# Patient Record
Sex: MALE | Race: WHITE | NOT HISPANIC OR LATINO | Employment: UNEMPLOYED | URBAN - METROPOLITAN AREA
[De-identification: names, ages, dates, MRNs, and addresses within clinical notes are randomized per-mention and may not be internally consistent; named-entity substitution may affect disease eponyms.]

---

## 2022-12-06 ENCOUNTER — HOSPITAL ENCOUNTER (EMERGENCY)
Facility: MEDICAL CENTER | Age: 42
End: 2022-12-06
Attending: EMERGENCY MEDICINE

## 2022-12-06 VITALS
RESPIRATION RATE: 20 BRPM | TEMPERATURE: 97.4 F | HEIGHT: 71 IN | BODY MASS INDEX: 26.39 KG/M2 | WEIGHT: 188.49 LBS | SYSTOLIC BLOOD PRESSURE: 117 MMHG | DIASTOLIC BLOOD PRESSURE: 76 MMHG | OXYGEN SATURATION: 98 % | HEART RATE: 94 BPM

## 2022-12-06 DIAGNOSIS — G25.81 RESTLESS LEG: ICD-10-CM

## 2022-12-06 DIAGNOSIS — L73.9 FOLLICULITIS: Primary | ICD-10-CM

## 2022-12-06 LAB
ANION GAP SERPL CALC-SCNC: 7 MMOL/L (ref 7–16)
BUN SERPL-MCNC: 8 MG/DL (ref 8–22)
CALCIUM SERPL-MCNC: 9.3 MG/DL (ref 8.5–10.5)
CHLORIDE SERPL-SCNC: 104 MMOL/L (ref 96–112)
CO2 SERPL-SCNC: 28 MMOL/L (ref 20–33)
CREAT SERPL-MCNC: 0.93 MG/DL (ref 0.5–1.4)
GFR SERPLBLD CREATININE-BSD FMLA CKD-EPI: 106 ML/MIN/1.73 M 2
GLUCOSE SERPL-MCNC: 93 MG/DL (ref 65–99)
POTASSIUM SERPL-SCNC: 3.9 MMOL/L (ref 3.6–5.5)
SODIUM SERPL-SCNC: 139 MMOL/L (ref 135–145)

## 2022-12-06 PROCEDURE — 36415 COLL VENOUS BLD VENIPUNCTURE: CPT

## 2022-12-06 PROCEDURE — 80048 BASIC METABOLIC PNL TOTAL CA: CPT

## 2022-12-06 PROCEDURE — 99283 EMERGENCY DEPT VISIT LOW MDM: CPT

## 2022-12-06 PROCEDURE — 700102 HCHG RX REV CODE 250 W/ 637 OVERRIDE(OP): Performed by: EMERGENCY MEDICINE

## 2022-12-06 PROCEDURE — A9270 NON-COVERED ITEM OR SERVICE: HCPCS | Performed by: EMERGENCY MEDICINE

## 2022-12-06 RX ORDER — DOXYCYCLINE 100 MG/1
100 CAPSULE ORAL 2 TIMES DAILY
Qty: 14 CAPSULE | Refills: 0 | Status: SHIPPED | OUTPATIENT
Start: 2022-12-06 | End: 2022-12-13

## 2022-12-06 RX ORDER — GABAPENTIN 300 MG/1
300 CAPSULE ORAL ONCE
Status: COMPLETED | OUTPATIENT
Start: 2022-12-06 | End: 2022-12-06

## 2022-12-06 RX ADMIN — GABAPENTIN 300 MG: 300 CAPSULE ORAL at 03:51

## 2022-12-06 ASSESSMENT — ENCOUNTER SYMPTOMS
NERVOUS/ANXIOUS: 1
TREMORS: 1

## 2022-12-06 NOTE — ED PROVIDER NOTES
"ED Provider Note    Scribed for Mani Kohler II, M* by Janeth Ferrer. 12/6/2022  3:02 AM    Means of Arrival: Walk-in  History obtained by: Patient   Limitations: None     CHIEF COMPLAINT  Chief Complaint   Patient presents with    Tremors     The pt reports trying to go to sleep but was unable to due to the whole body restlessness and twitchs. Legs are more twitchy then the rest of the body. Left side of the face feels dull but not numb. The pt reports chronic tinnitus but is worse at this time. The pt continues to report twitchiness. The pt reports chronic use of benzos for last 20 years and has been off for the last month. The pt reports feeling anxious but not panic attack.    No facial droop, slurred speech, or arm droop noted.    Anxiety       HPI  Jori Brooks is a 42 y.o. male who presents to the Emergency Department for evaluation of bilateral leg tremors onset 1 hour prior to arrival. Todd states that he was trying to sleep tonight when he suddenly awoke because \"I felt like there were bugs crawling all over me\".  He denies a history of restless leg syndrome. Todd reports that he has not had problems like this before. He also reports a history of chronic tinnitus, but feels like it is worse at this time. He admits to associated symptoms of body aches, anxiety, and facial rash, but denies loss of appetite. No alleviating or exacerbating factors were reported. Jori reports prior alcohol abuse. He adds that his last alcoholic beverage was about 2 weeks ago.     REVIEW OF SYSTEMS  Review of Systems   Skin:  Positive for itching and rash.   Neurological:  Positive for tremors.   Psychiatric/Behavioral:  The patient is nervous/anxious.    All other systems reviewed and are negative.  See HPI for further details.    PAST MEDICAL HISTORY   has a past medical history of Anxiety.    SOCIAL HISTORY  Social History     Tobacco Use    Smoking status: Every Day     Packs/day: 0.50     Types: Cigarettes " "   Smokeless tobacco: Never   Vaping Use    Vaping Use: Never used   Substance and Sexual Activity    Alcohol use: Not Currently    Drug use: Not Currently    Sexual activity: Not on file       SURGICAL HISTORY  patient denies any surgical history    CURRENT MEDICATIONS  Home Medications       Reviewed by Gurjit Palomino R.N. (Registered Nurse) on 12/06/22 at 0106  Med List Status: Partial     Medication Last Dose Status        Patient Seth Taking any Medications                           ALLERGIES  Allergies   Allergen Reactions    Sulfa Drugs Rash     When 11 years old       PHYSICAL EXAM  VITAL SIGNS: /84   Pulse 92   Temp 36.2 °C (97.1 °F) (Temporal)   Resp 16   Ht 1.803 m (5' 11\")   Wt 85.5 kg (188 lb 7.9 oz)   SpO2 97%   BMI 26.29 kg/m²       Pulse ox interpretation: I interpret this pulse ox as normal.  Constitutional: Alert in no apparent distress.   HENT: No signs of trauma, Bilateral external ears normal, Nose normal.   Eyes: Pupils are equal, Conjunctiva normal, Non-icteric.   Neck: Normal range of motion, No tenderness, Supple, No stridor.   Cardiovascular: Regular rate and rhythm, no murmurs. Symmetric distal pulses. No cyanosis of extremities. No peripheral edema of extremities.  Thorax & Lungs: Normal breath sounds, No respiratory distress, No wheezing, No chest tenderness.   Abdomen: Soft, No tenderness, No masses, No pulsatile masses. No peritoneal signs.  Skin: Warm, Dry. Pustular lesions at beard and hair.   Back: No midline bony tenderness, No CVA tenderness.   Musculoskeletal: Good range of motion in all major joints. No tenderness to palpation or major deformities noted.   Neurologic: Alert and oriented.  Normal strength. No speech change. Normal mentation.  Psychiatric: Affect normal, Judgment normal, Mood normal.     DIAGNOSTIC STUDIES / PROCEDURES    LABS  Labs Reviewed   BASIC METABOLIC PANEL   ESTIMATED GFR     Pertinent Labs & Imaging studies reviewed. (See chart for " details)    COURSE & MEDICAL DECISION MAKING  Pertinent Labs & Imaging studies reviewed. (See chart for details)    3:02 AM This is a 42 y.o. male who presents with bilateral leg tremors and anxiety. Ordered for BMP to evaluate. Patient will be treated with Neurontin for restless leg symptoms Discussed plan of care with beatrice. I informed them that labs will be ordered to evaluate electrolytes.  Beatrice is understanding and agreeable with plan.  Will prescribe doxycycline for him to take for mild folliculitis.     5:02 AM - I reevaluated the patient at bedside. The patient informs me they feel improved. I discussed the patient's diagnostic study results which were normal. I discussed plan for discharge and follow up as outlined below. The patient will return for worsening symptoms and is stable at the time of discharge. The patient verbalizes understanding and will comply. Guidance provided on appropriate use of medications.    DISPOSITION:  Patient will be discharged home in stable condition.    FOLLOW UP:  [facility] ([facility]) - Primary Care and Family Medicine  [address] [place] [address]  [phone]  Call   As needed    OUTPATIENT MEDICATIONS:  Discharge Medication List as of 12/6/2022  5:04 AM        START taking these medications    Details   doxycycline (MONODOX) 100 MG capsule Take 1 Capsule by mouth 2 times a day for 7 days., Disp-14 Capsule, R-0, Normal             FINAL IMPRESSION  1. Folliculitis    2. Restless leg          Janeth MCARTHUR (Nataliaibcandace)jess scribing for, and in the presence of, BRITTON Madison II.    Electronically signed by: Janeth Santacruz), 12/6/2022    Mani MCARTHUR II, M* personally performed the services described in this documentation, as scribed by Janeth Ferrer in my presence, and it is both accurate and complete.    The note accurately reflects work and decisions made by me.  Mani Kohler II, M.D.  12/6/2022  9:18 AM

## 2022-12-06 NOTE — ED TRIAGE NOTES
"Chief Complaint   Patient presents with    Tremors     The pt reports trying to go to sleep but was unable to due to the whole body restlessness and twitchs. Legs are more twitchy then the rest of the body. Left side of the face feels dull but not numb. The pt reports chronic tinnitus but is worse at this time. The pt continues to report twitchiness. The pt reports chronic use of benzos for last 20 years and has been off for the last month. The pt reports feeling anxious but not panic attack.    No facial droop, slurred speech, or arm droop noted.    Anxiety       Pt ambulatory to triage. Pt A&Ox4, for the above complaint.     Pt to lobby . Pt educated on alerting staff in changes to condition. Pt verbalized understanding.     /84   Pulse 92   Temp 36.2 °C (97.1 °F) (Temporal)   Resp 16   Ht 1.803 m (5' 11\")   Wt 85.5 kg (188 lb 7.9 oz)   SpO2 97%   BMI 26.29 kg/m²     "

## 2022-12-06 NOTE — ED NOTES
Pt just walked out of department. Didn't speak to this RN but told the front he was running to his car and he would be right back

## 2022-12-06 NOTE — ED NOTES
Pt ambulated from  to [place] [id number] by self with no difficulty. Pt reports anxiousness and has been off medications x 1 month and is restless. Pt also c/o bumps all over his face/scalp and generalized itching/twitching

## 2023-03-23 ENCOUNTER — HOSPITAL ENCOUNTER (EMERGENCY)
Facility: MEDICAL CENTER | Age: 43
End: 2023-03-23
Attending: EMERGENCY MEDICINE

## 2023-03-23 VITALS
HEART RATE: 82 BPM | TEMPERATURE: 98.3 F | RESPIRATION RATE: 18 BRPM | BODY MASS INDEX: 26.08 KG/M2 | SYSTOLIC BLOOD PRESSURE: 109 MMHG | WEIGHT: 186.29 LBS | OXYGEN SATURATION: 93 % | HEIGHT: 71 IN | DIASTOLIC BLOOD PRESSURE: 72 MMHG

## 2023-03-23 DIAGNOSIS — L02.91 ABSCESS: ICD-10-CM

## 2023-03-23 PROCEDURE — 700101 HCHG RX REV CODE 250: Performed by: EMERGENCY MEDICINE

## 2023-03-23 PROCEDURE — A9270 NON-COVERED ITEM OR SERVICE: HCPCS | Performed by: EMERGENCY MEDICINE

## 2023-03-23 PROCEDURE — 99283 EMERGENCY DEPT VISIT LOW MDM: CPT

## 2023-03-23 PROCEDURE — 700102 HCHG RX REV CODE 250 W/ 637 OVERRIDE(OP): Performed by: EMERGENCY MEDICINE

## 2023-03-23 PROCEDURE — 303977 HCHG I & D

## 2023-03-23 RX ORDER — DOXYCYCLINE 100 MG/1
100 CAPSULE ORAL 2 TIMES DAILY
Qty: 14 CAPSULE | Refills: 0 | Status: ACTIVE | OUTPATIENT
Start: 2023-03-23 | End: 2023-03-30

## 2023-03-23 RX ORDER — IBUPROFEN 600 MG/1
600 TABLET ORAL ONCE
Status: COMPLETED | OUTPATIENT
Start: 2023-03-23 | End: 2023-03-23

## 2023-03-23 RX ORDER — LIDOCAINE HYDROCHLORIDE AND EPINEPHRINE BITARTRATE 20; .01 MG/ML; MG/ML
10 INJECTION, SOLUTION SUBCUTANEOUS ONCE
Status: COMPLETED | OUTPATIENT
Start: 2023-03-23 | End: 2023-03-23

## 2023-03-23 RX ORDER — DOXYCYCLINE 100 MG/1
100 TABLET ORAL ONCE
Status: COMPLETED | OUTPATIENT
Start: 2023-03-23 | End: 2023-03-23

## 2023-03-23 RX ADMIN — DOXYCYCLINE 100 MG: 100 TABLET, FILM COATED ORAL at 07:48

## 2023-03-23 RX ADMIN — IBUPROFEN 600 MG: 600 TABLET, FILM COATED ORAL at 07:48

## 2023-03-23 RX ADMIN — LIDOCAINE HYDROCHLORIDE AND EPINEPHRINE 10 ML: 20; 10 INJECTION, SOLUTION INFILTRATION; PERINEURAL at 08:14

## 2023-03-23 NOTE — ED TRIAGE NOTES
Chief Complaint   Patient presents with    Abscess     Small abscess to the left jaw line x2 days. Skin is warm to the touch and mildly swollen around the perimeter. Patient states he stopped using meth last week and it appeared shortly after.       Patient denies CP or SOB.     Patient ambulatory to triage. A&Ox4, speaking in full sentences. Patient educated on triage process and encouraged to notify staff if condition worsens. NAD. Patient returned to the lobby.  
No

## 2023-03-23 NOTE — ED NOTES
Pt ambulatory to [facility] with a steady gait. Pt connected to the monitor, given warm blankets, and call light placed within reach. Chart up for ERP.

## 2023-03-23 NOTE — ED NOTES
Reviewed discharge instructions, pt verbalized understanding of instructions and medication. States he will schedule follow-up appointment if needed. Denies further questions at this time. Pt ambulatory out of ER with steady gait.

## 2023-03-23 NOTE — ED PROVIDER NOTES
"  ER Provider Note    Scribed for Matias Gottlieb M.d. by Jose Cruz Valdes. 3/23/2023  7:31 AM    Primary Care Provider: Pcp Pt States None    CHIEF COMPLAINT  Chief Complaint   Patient presents with    Abscess     Small abscess to the left jaw line x2 days. Skin is warm to the touch and mildly swollen around the perimeter. Patient states he stopped using meth last week and it appeared shortly after.      EXTERNAL RECORDS REVIEWED  Other None    HPI/ROS  LIMITATION TO HISTORY   Select: : None  OUTSIDE HISTORIAN(S):  None    Jori Brooks is a 42 y.o. male who presents to the ED complaining of swelling to the left jaw onset 2 days ago. He believes the swelling to be an abscess. He has associated redness and warmth to the area, and mild chills. He denies any fevers, or sweats. He is allergic to sulfa drugs. He quit using meth last week. The swelling appeared a few days later. He notes that a few days ago he had some other small swellings pop up to his right shoulder area, but they went away. No alleviating or exacerbating factors noted.     PAST MEDICAL HISTORY  Past Medical History:   Diagnosis Date    Anxiety        SURGICAL HISTORY  History reviewed. No pertinent surgical history.    FAMILY HISTORY  History reviewed. No pertinent family history.    SOCIAL HISTORY   reports that he has been smoking cigarettes. He has been smoking an average of .75 packs per day. He has never used smokeless tobacco. He reports that he does not currently use alcohol. He reports that he does not currently use drugs.    CURRENT MEDICATIONS  No current outpatient medications.    ALLERGIES  Sulfa drugs    PHYSICAL EXAM  /80   Pulse 96   Temp 36.3 °C (97.3 °F) (Temporal)   Resp 16   Ht 1.803 m (5' 11\")   Wt 84.5 kg (186 lb 4.6 oz)   SpO2 94%   BMI 25.98 kg/m²   Constitutional: Well developed, Well nourished, No acute distress, Non-toxic appearance.   HENT: Normocephalic, Atraumatic, Bilateral external ears normal, Oropharynx is " clear mucous membranes are moist. No oral exudates or nasal discharge. To the left upper jaw line there is an area of induration, erythema, warmth, and tenderness.   Eyes: Pupils are equal round and reactive, EOMI, Conjunctiva normal, No discharge.   Neck: Normal range of motion, No tenderness, Supple, No stridor. No meningismus.  Lymphatic: Slight adenopathy in anterior auricular area and submandibular area.  Cardiovascular: Regular rate and rhythm without murmur rub or gallop.  Thorax & Lungs: Clear breath sounds bilaterally without wheezes, rhonchi or rales. There is no chest wall tenderness.   Abdomen: Soft non-tender non-distended. There is no rebound or guarding. No organomegaly is appreciated. Bowel sounds are normal.  Skin: Area of induration erythema, induration, erythema, and warmth to the left upper jaw.  Back: No CVA or spinal tenderness.   Extremities: Intact distal pulses, No edema, No tenderness, No cyanosis, No clubbing. Capillary refill is less than 2 seconds.  Musculoskeletal: Good range of motion in all major joints. No tenderness to palpation or major deformities noted.   Neurologic: Alert & oriented x 3, Normal motor function, Normal sensory function, No focal deficits noted. Reflexes are normal.  Psychiatric: Affect normal, Judgment normal, Mood normal. There is no suicidal ideation or patient reported hallucinations.     DIAGNOSTIC STUDIES    Radiology:   The attending emergency physician has independently interpreted the bedside ultrasound performed by [facility] with high-frequency probe that shows a small pus pocket just under the left jawline where the patient is complaining of pain and consistent with physical exam.      PROCEDURES    Incision and Drainage Procedure Note    Indication: Abscess    Procedure: The patient was positioned appropriately and the skin over the incision site was prepped with alcohol. Local anesthesia was obtained by infiltration using 1% Lidocaine with epinephrine and I  used approximately 1.5 cc.  An incision was then made over the apex of the lesion and approximately 0.5 cc of thick material was expressed. Loculations were not present. The drainage cavity was then dressed with a sterile dressing. The patient’s tetanus status was up to date and did not require a booster dose.    The patient tolerated the procedure well.    Complications: None      COURSE & MEDICAL DECISION MAKING     ED Observation Status? No; Patient does not meet criteria for ED Observation.     INITIAL ASSESSMENT, COURSE AND PLAN  Care Narrative: I was able to determine that the patient does indeed have a pus pocket and I recommended incision and drainage of this small abscess.    7:31 AM - Patient seen and examined at bedside. Patient will be treated with Motrin 600 mg, and Adoxa 100 mg. I informed the patient that he likely does not have an abscess, but I will perform a bedside ultrasound to confirm. If there is no abscess, he will be discharged with antibiotics. He is understandable and agreeable with the plan of care.    8:03 AM - Bedside ultrasound revealed a drainable small pus pocket. I plan to drain it. He is agreeable with this plan.    8:06 AM - Incision and drainage procedure performed by me. See details above. Patient will be discharged with antibiotics. Discussed discharge instructions and return precautions with the patient and they were cleared for discharge. Patient was given the opportunity to ask any further questions. He is comfortable with discharge at this time.         DISPOSITION AND DISCUSSIONS  I have discussed management of the patient with the following physicians and IRENE's:  None    Discussion of management with other QHP or appropriate source(s): None     Decision tools and prescription drugs considered including, but not limited to: Antibiotics   .    We additionally discussed his anxiety disorder.  The patient was asking for possibly some Ativan by prescription but I feel that this  is not warranted at this time and she should talk to his primary care provider as benzos have fallen out of favor for treatment of anxiety disorder and he is comfortable with this plan of care    Patient will be discharged home.    FOLLOW UP:  No follow-up provider specified.    OUTPATIENT MEDICATIONS:  New Prescriptions    DOXYCYCLINE (MONODOX) 100 MG CAPSULE    Take 1 Capsule by mouth 2 times a day for 7 days.       FINAL DIANGOSIS  1. Abscess    2.  Incision and drainage of abscess by ERP    Jose Cruz MCARTHUR (Scribe), am scribing for, and in the presence of, Matias Gottlieb M.D..    Electronically signed by: Jose Cruz Valdes (Scribe), 3/23/2023    Matias MCARTHUR M.D. personally performed the services described in this documentation, as scribed by Jose Cruz Valdes in my presence, and it is both accurate and complete.     The note accurately reflects work and decisions made by me.  Matias Gottlieb M.D.  3/23/2023  8:21 AM

## 2023-03-23 NOTE — DISCHARGE INSTRUCTIONS
Please take doxycycline for 1 week and use warm compresses frequently the next 24 to 48 hours to increase the amount of discharge from the incised wound

## 2023-06-19 ENCOUNTER — HOSPITAL ENCOUNTER (EMERGENCY)
Facility: MEDICAL CENTER | Age: 43
End: 2023-06-19
Attending: EMERGENCY MEDICINE

## 2023-06-19 VITALS
OXYGEN SATURATION: 93 % | HEIGHT: 71 IN | DIASTOLIC BLOOD PRESSURE: 85 MMHG | BODY MASS INDEX: 26.39 KG/M2 | WEIGHT: 188.49 LBS | SYSTOLIC BLOOD PRESSURE: 126 MMHG | TEMPERATURE: 98.1 F | RESPIRATION RATE: 16 BRPM | HEART RATE: 93 BPM

## 2023-06-19 DIAGNOSIS — I10 PRIMARY HYPERTENSION: ICD-10-CM

## 2023-06-19 DIAGNOSIS — J02.0 STREP PHARYNGITIS: Primary | ICD-10-CM

## 2023-06-19 LAB
FLUAV RNA SPEC QL NAA+PROBE: NEGATIVE
FLUBV RNA SPEC QL NAA+PROBE: NEGATIVE
RSV RNA SPEC QL NAA+PROBE: NEGATIVE
S PYO DNA SPEC NAA+PROBE: DETECTED
SARS-COV-2 RNA RESP QL NAA+PROBE: NOTDETECTED
SPECIMEN SOURCE: NORMAL

## 2023-06-19 PROCEDURE — 99284 EMERGENCY DEPT VISIT MOD MDM: CPT

## 2023-06-19 PROCEDURE — 700101 HCHG RX REV CODE 250: Performed by: EMERGENCY MEDICINE

## 2023-06-19 PROCEDURE — 700102 HCHG RX REV CODE 250 W/ 637 OVERRIDE(OP): Performed by: EMERGENCY MEDICINE

## 2023-06-19 PROCEDURE — 0241U HCHG SARS-COV-2 COVID-19 NFCT DS RESP RNA 4 TRGT MIC: CPT

## 2023-06-19 PROCEDURE — 87651 STREP A DNA AMP PROBE: CPT

## 2023-06-19 PROCEDURE — 700111 HCHG RX REV CODE 636 W/ 250 OVERRIDE (IP): Performed by: EMERGENCY MEDICINE

## 2023-06-19 PROCEDURE — C9803 HOPD COVID-19 SPEC COLLECT: HCPCS | Performed by: EMERGENCY MEDICINE

## 2023-06-19 PROCEDURE — A9270 NON-COVERED ITEM OR SERVICE: HCPCS | Performed by: EMERGENCY MEDICINE

## 2023-06-19 RX ORDER — AMOXICILLIN 875 MG/1
875 TABLET, COATED ORAL 2 TIMES DAILY
Qty: 14 TABLET | Refills: 0 | Status: ACTIVE | OUTPATIENT
Start: 2023-06-20 | End: 2023-06-27

## 2023-06-19 RX ORDER — LIDOCAINE HYDROCHLORIDE 20 MG/ML
15 SOLUTION OROPHARYNGEAL ONCE
Status: COMPLETED | OUTPATIENT
Start: 2023-06-19 | End: 2023-06-19

## 2023-06-19 RX ORDER — IBUPROFEN 600 MG/1
600 TABLET ORAL ONCE
Status: COMPLETED | OUTPATIENT
Start: 2023-06-19 | End: 2023-06-19

## 2023-06-19 RX ORDER — DEXAMETHASONE SODIUM PHOSPHATE 4 MG/ML
10 INJECTION, SOLUTION INTRA-ARTICULAR; INTRALESIONAL; INTRAMUSCULAR; INTRAVENOUS; SOFT TISSUE ONCE
Status: COMPLETED | OUTPATIENT
Start: 2023-06-19 | End: 2023-06-19

## 2023-06-19 RX ORDER — AMOXICILLIN 500 MG/1
1000 CAPSULE ORAL ONCE
Status: COMPLETED | OUTPATIENT
Start: 2023-06-19 | End: 2023-06-19

## 2023-06-19 RX ADMIN — LIDOCAINE HYDROCHLORIDE 15 ML: 20 SOLUTION ORAL; TOPICAL at 19:39

## 2023-06-19 RX ADMIN — IBUPROFEN 600 MG: 600 TABLET, FILM COATED ORAL at 19:39

## 2023-06-19 RX ADMIN — AMOXICILLIN 1000 MG: 500 CAPSULE ORAL at 20:05

## 2023-06-19 RX ADMIN — DEXAMETHASONE SODIUM PHOSPHATE 10 MG: 4 INJECTION, SOLUTION INTRAMUSCULAR; INTRAVENOUS at 20:06

## 2023-06-20 NOTE — ED TRIAGE NOTES
"Chief Complaint   Patient presents with    Sore Throat     Pt reports ongoing x4 days, worse on L side. Pt reports subjective fevers. Pt reports decreased PO intake d/t symptoms.      /81   Pulse (!) 120   Temp 36.5 °C (97.7 °F) (Temporal)   Resp 18   Ht 1.803 m (5' 11\")   Wt 85.5 kg (188 lb 7.9 oz)   SpO2 96%   BMI 26.29 kg/m²     Pt ambulatory to triage for above.   "

## 2023-06-20 NOTE — ED PROVIDER NOTES
"ED Provider Note    CHIEF COMPLAINT  Chief Complaint   Patient presents with    Sore Throat     Pt reports ongoing x4 days, worse on L side. Pt reports subjective fevers. Pt reports decreased PO intake d/t symptoms.        EXTERNAL RECORDS REVIEWED  Records show 2 prior emergency department visits, no other records of any kind in our system.    HPI/ROS  LIMITATION TO HISTORY       OUTSIDE HISTORIAN(S):      Jori Brooks is a 42 y.o. male who presents to the emergency department for 4 days of sore throat, worse within the past 24 to 48 hours.  He does not have a history of recurrent strep infections.  He reports pain with swallowing, subjective fevers, and a sense of racing heart.  He is afebrile, but slightly tachycardic.  He has not taken anything other than Benadryl for his symptoms.  He is not diabetic or immunosuppressed, denies daily medications.    PAST MEDICAL HISTORY   has a past medical history of Anxiety.    SURGICAL HISTORY  patient denies any surgical history    FAMILY HISTORY  History reviewed. No pertinent family history.    SOCIAL HISTORY  Social History     Tobacco Use    Smoking status: Every Day     Packs/day: 0.50     Types: Cigarettes    Smokeless tobacco: Never   Vaping Use    Vaping Use: Never used   Substance and Sexual Activity    Alcohol use: Yes    Drug use: Yes     Comment: cocaine    Sexual activity: Not on file       CURRENT MEDICATIONS  Home Medications       Reviewed by Carla Abbott R.N. (Registered Nurse) on 06/19/23 at 3516  Med List Status: Partial     Medication Last Dose Status        Patient Seth Taking any Medications                           ALLERGIES  Allergies   Allergen Reactions    Sulfa Drugs Rash     When 11 years old       PHYSICAL EXAM  VITAL SIGNS: /85   Pulse 93   Temp 36.7 °C (98.1 °F) (Temporal)   Resp 16   Ht 1.803 m (5' 11\")   Wt 85.5 kg (188 lb 7.9 oz)   SpO2 93%   BMI 26.29 kg/m²   Pulse ox interpretation: I interpret this pulse ox as " normal.  Constitutional: Alert in no apparent distress.  HENT: Erythematous posterior oropharynx with tonsillar enlargement and exudate.  Lymph: Tender bilateral anterior cervical lymphadenopathy  Eyes: Pupils are equal and reactive, Conjunctiva normal, Non-icteric.   Neck: Normal range of motion, Supple, No stridor.    Cardiovascular: Normal peripheral perfusion  Thorax & Lungs: Unlabored respirations, equal chest expansion, no accessory muscle use  Abdomen: Non-distended  Skin:  No erythema, No rash.   Back: Normal alignment and ROM  Extremities: No gross deformity  Musculoskeletal: Good range of motion in all major joints.   Neurologic: Alert, Normal motor function, No focal deficits noted.   Psychiatric: Affect normal, Judgment normal, Mood normal.      DIAGNOSTIC STUDIES / PROCEDURES      LABS  Labs Reviewed   GROUP A STREP BY PCR - Abnormal; Notable for the following components:       Result Value    Group A Strep by PCR DETECTED (*)     All other components within normal limits   COV-2, FLU A/B, AND RSV BY PCR (CEPHEID)         COURSE & MEDICAL DECISION MAKING    ED Observation Status? Yes; I am placing the patient in to an observation status due to a diagnostic uncertainty as well as therapeutic intensity. Patient placed in observation status at 7:00 PM, 6/19/2023.     Observation plan is as follows: Symptomatic treatment, laboratory evaluation.    Upon Reevaluation, the patient's condition has: Improved; and will be discharged.    Patient discharged from ED Observation status at 7:55 PM (Time) 6/19/2023 (Date).     INITIAL ASSESSMENT, COURSE AND PLAN  Care Narrative:     7 PM patient evaluated the bedside.  He has obvious pharyngitis, strep versus viral.  COVID is a possibility, though strep seems more likely.  He is being treated with Zofran and viscous lidocaine, and evaluated with a strep PCR and COVID swab.    8:00 PM patient reevaluated at the bedside.  Strep test was positive.  He will be treated with  Decadron and amoxicillin, and given return precautions for treatment failure or worsening symptoms.  He is happy to be discharged home.  Presenting tachycardia has resolved.    HTN/IDDM FOLLOW UP:  The patient is referred to a primary physician for blood pressure management, diabetic screening, and for all other preventive health concerns      ADDITIONAL PROBLEM LIST  Untreated hypertension, referred to primary care and discussed with patient.    DISPOSITION AND DISCUSSIONS    Escalation of care considered, and ultimately not performed:IV fluids and blood analysis, however, he is not febrile, mild presenting tachycardia resolved, no reason to suspect significant systemic illness.    Barriers to care at this time, including but not limited to: Patient does not have established PCP.     Decision tools and prescription drugs considered including, but not limited to: Antibiotics were prescribed at discharge to treat strep pharyngitis. .    FINAL DIAGNOSIS  1. Strep pharyngitis    2. Primary hypertension           Electronically signed by: Darion Amanda M.D., 6/19/2023 7:15 PM

## 2023-06-20 NOTE — ED NOTES
Patient had discharge instructions gone over via ERP, patient had no questions. Patient then was assisted to ER lobby with all possessions. Patient will be discharged from system.

## 2023-06-20 NOTE — DISCHARGE INSTRUCTIONS
You have strep throat.  You are started antibiotics here, and we sent the rest of the needed antibiotics to your pharmacy.  The steroid given here should also help with swelling and inflammation.  You will have some symptoms still for the next couple of days, but as they should be steadily improving.  For your high blood pressure and general health care, we have put in a referral so that our schedulers can contact you to help arrange a primary care appointment.  Return here if you are getting worse in the meantime.

## 2023-06-23 ENCOUNTER — TELEPHONE (OUTPATIENT)
Dept: HEALTH INFORMATION MANAGEMENT | Facility: OTHER | Age: 43
End: 2023-06-23

## 2023-08-18 ENCOUNTER — HOSPITAL ENCOUNTER (EMERGENCY)
Facility: MEDICAL CENTER | Age: 43
End: 2023-08-18
Attending: EMERGENCY MEDICINE

## 2023-08-18 VITALS
HEIGHT: 71 IN | BODY MASS INDEX: 27.16 KG/M2 | TEMPERATURE: 98 F | WEIGHT: 194 LBS | RESPIRATION RATE: 16 BRPM | DIASTOLIC BLOOD PRESSURE: 80 MMHG | SYSTOLIC BLOOD PRESSURE: 125 MMHG | HEART RATE: 72 BPM | OXYGEN SATURATION: 98 %

## 2023-08-18 DIAGNOSIS — R21 RASH: ICD-10-CM

## 2023-08-18 PROCEDURE — A9270 NON-COVERED ITEM OR SERVICE: HCPCS | Performed by: EMERGENCY MEDICINE

## 2023-08-18 PROCEDURE — 700102 HCHG RX REV CODE 250 W/ 637 OVERRIDE(OP): Performed by: EMERGENCY MEDICINE

## 2023-08-18 PROCEDURE — 99283 EMERGENCY DEPT VISIT LOW MDM: CPT

## 2023-08-18 RX ORDER — DOXYCYCLINE 100 MG/1
100 TABLET ORAL ONCE
Status: COMPLETED | OUTPATIENT
Start: 2023-08-18 | End: 2023-08-18

## 2023-08-18 RX ORDER — DOXYCYCLINE 100 MG/1
100 CAPSULE ORAL 2 TIMES DAILY
Qty: 14 CAPSULE | Refills: 0 | Status: ACTIVE | OUTPATIENT
Start: 2023-08-18 | End: 2023-08-25

## 2023-08-18 RX ADMIN — DOXYCYCLINE 100 MG: 100 TABLET, FILM COATED ORAL at 08:15

## 2023-08-18 NOTE — ED PROVIDER NOTES
"ED Provider Note    CHIEF COMPLAINT  Chief Complaint   Patient presents with    Rash     C/o rash / raised bumps on his face, back of neck and chest x 1.5 week. States he was Dx with Folliculitis in the past. States \"it looks like it is back again \".        EXTERNAL RECORDS REVIEWED  Other -patient was seen in this ER in June for sore throat.  Strep test was positive.  He was treated with Decadron and amoxicillin.  Discharged to follow-up as an outpatient.    HPI/ROS  LIMITATION TO HISTORY   Select: : None  OUTSIDE HISTORIAN(S):  None    Jori Brooks is a 42 y.o. male who presents with a chief complaint of rash on his face and neck that has been ongoing for the past 1-1/2 weeks.  He notes he was diagnosed with hot tub folliculitis approximately 1 year ago, started on antibiotics/doxycycline at which time it completely resolved.  The symptoms are exactly the same now as they were 1 year ago however he has not been in the hot tub recently.  He notes that over the past year the symptoms have come and gone several times and he has been prescribed antibiotics but has not taken them to completion.  The rash is raised red bumps, minimally pruritic, and only some of them are painful.  He has not had any recent antibiotic use.  He denies any fevers, nausea or vomiting, systemic symptoms.  He denies any high risk behaviors such same-sex intercourse without protection or IV drug use.    PAST MEDICAL HISTORY   has a past medical history of Anxiety.    SURGICAL HISTORY  patient denies any surgical history    FAMILY HISTORY  History reviewed. No pertinent family history.    SOCIAL HISTORY  Social History     Tobacco Use    Smoking status: Every Day     Packs/day: .5     Types: Cigarettes    Smokeless tobacco: Never   Vaping Use    Vaping Use: Never used   Substance and Sexual Activity    Alcohol use: Yes    Drug use: Yes     Comment: cocaine    Sexual activity: Not on file       CURRENT MEDICATIONS  Home Medications       " "Reviewed by Ruperto Cordova R.N. (Registered Nurse) on 08/18/23 at 0657  Med List Status: Partial     Medication Last Dose Status        Patient Seth Taking any Medications                           ALLERGIES  Allergies   Allergen Reactions    Sulfa Drugs Rash     When 11 years old       PHYSICAL EXAM  VITAL SIGNS: BP (!) 129/91   Pulse 76   Temp 36.1 °C (97 °F) (Temporal)   Resp 16   Ht 1.803 m (5' 11\")   Wt 88 kg (194 lb 0.1 oz)   SpO2 97%   BMI 27.06 kg/m²    Physical Exam  Vitals and nursing note reviewed.   Constitutional:       Appearance: Normal appearance.   HENT:      Head: Normocephalic and atraumatic.      Right Ear: External ear normal.      Left Ear: External ear normal.      Nose: Nose normal.      Mouth/Throat:      Mouth: Mucous membranes are moist.      Pharynx: Oropharynx is clear.   Eyes:      Extraocular Movements: Extraocular movements intact.      Conjunctiva/sclera: Conjunctivae normal.      Pupils: Pupils are equal, round, and reactive to light.   Cardiovascular:      Rate and Rhythm: Normal rate and regular rhythm.   Pulmonary:      Effort: Pulmonary effort is normal.      Breath sounds: Normal breath sounds.   Abdominal:      Palpations: Abdomen is soft.      Tenderness: There is no abdominal tenderness.   Musculoskeletal:         General: Normal range of motion.      Cervical back: Normal range of motion and neck supple.   Skin:     General: Skin is warm and dry.      Comments: Scattered blanching raised erythematous lesions over the face without obvious discharge or crusting.  No rash over the extremities or palms/wrists.   Neurological:      General: No focal deficit present.      Mental Status: He is alert and oriented to person, place, and time.   Psychiatric:         Mood and Affect: Mood normal.         Behavior: Behavior normal.       DIAGNOSTIC STUDIES / PROCEDURES    COURSE & MEDICAL DECISION MAKING    ED Observation Status? No; Patient does not meet criteria for ED " Observation.     INITIAL ASSESSMENT, COURSE AND PLAN  Care Narrative: This is a 42-year-old male who is here with a raised erythematous rash over the face and neck that has been ongoing for the past 10 days.  He has had similar rashes over the course of the past year that have responded to doxycycline.    Differential diagnosis includes, but is not limited to, MRSA, vasculitis, folliculitis, strep, lupus, dermatitis, TENS, SJS, syphilis.    Arrives afebrile with normal vital signs.  Appears well-hydrated and nontoxic.  Denies any systemic symptoms.  Rash is never been on the palms of hands and today does spare the hands and wrists.  There is no rash on the extremities.  It is blanching.  At this point I have a low suspicion that this represents folliculitis.  He notes that the rash has resolved with doxycycline in the past and we will plan to trial that but he needs follow-up with a dermatologist specifically.  At this point I do not think labs or imaging will help us to determine the etiology of this rash so they were deferred.  We discussed that if he develops any systemic symptoms or the rash changes or worsens he does need to return to the ER at which point we may consider a more in-depth work-up.  I placed a referral to dermatology on his behalf and he was given a list of local clinics where of asked him to establish, he was discharged in good and stable condition with strict return precautions.    ADDITIONAL PROBLEM LIST  None  DISPOSITION AND DISCUSSIONS  I have discussed management of the patient with the following physicians and IRENE's: None    Discussion of management with other Q or appropriate source(s): None     Escalation of care considered, and ultimately not performed:blood analysis and acute inpatient care management, however at this time, the patient is most appropriate for outpatient management    Barriers to care at this time, including but not limited to: Patient does not have established PCP.      Decision tools and prescription drugs considered including, but not limited to: Antibiotics -doxycycline .    FINAL DIAGNOSIS  1. Rash      Electronically signed by: Amauri Britton M.D., 8/18/2023 7:47 AM

## 2023-08-18 NOTE — ED TRIAGE NOTES
"Jori Brooks  42 y.o.  Male  Chief Complaint   Patient presents with    Rash     C/o rash / raised bumps on his face, back of neck and chest x 1.5 week. States he was Dx with Folliculitis in the past. States \"it looks like it is back again \".        "

## 2023-08-18 NOTE — ED NOTES
..Pt verbalizes understanding of dc instructions. Rx given.  Pt states all questions have been answered and they feel comfortable with dc instructions provided. Pt states has all personal belonging. Pt to lobby w/o incident

## 2023-08-18 NOTE — DISCHARGE INSTRUCTIONS
You were seen in the ER for facial rash.  I have called you in a prescription for doxycycline, take it as directed.  I also placed a referral to dermatology.  It would be important that you establish with a primary care physician and I gave you a list of local clinics we can do so, sometimes dermatologist come to these clinics as well also ask when you make the appointment if you would be able to also see a dermatologist.  If you develop new or worsening symptoms return immediately to the ER.

## 2023-09-23 ENCOUNTER — HOSPITAL ENCOUNTER (EMERGENCY)
Facility: MEDICAL CENTER | Age: 43
End: 2023-09-23
Attending: STUDENT IN AN ORGANIZED HEALTH CARE EDUCATION/TRAINING PROGRAM

## 2023-09-23 VITALS
WEIGHT: 192 LBS | HEIGHT: 71 IN | BODY MASS INDEX: 26.88 KG/M2 | RESPIRATION RATE: 18 BRPM | SYSTOLIC BLOOD PRESSURE: 134 MMHG | HEART RATE: 83 BPM | OXYGEN SATURATION: 95 % | DIASTOLIC BLOOD PRESSURE: 80 MMHG | TEMPERATURE: 98 F

## 2023-09-23 DIAGNOSIS — R00.2 PALPITATIONS: ICD-10-CM

## 2023-09-23 LAB
ALBUMIN SERPL BCP-MCNC: 4.2 G/DL (ref 3.2–4.9)
ALBUMIN/GLOB SERPL: 1.6 G/DL
ALP SERPL-CCNC: 66 U/L (ref 30–99)
ALT SERPL-CCNC: 18 U/L (ref 2–50)
ANION GAP SERPL CALC-SCNC: 11 MMOL/L (ref 7–16)
AST SERPL-CCNC: 19 U/L (ref 12–45)
BASOPHILS # BLD AUTO: 1.4 % (ref 0–1.8)
BASOPHILS # BLD: 0.13 K/UL (ref 0–0.12)
BILIRUB SERPL-MCNC: 0.5 MG/DL (ref 0.1–1.5)
BUN SERPL-MCNC: 11 MG/DL (ref 8–22)
CALCIUM ALBUM COR SERPL-MCNC: 8.9 MG/DL (ref 8.5–10.5)
CALCIUM SERPL-MCNC: 9.1 MG/DL (ref 8.5–10.5)
CHLORIDE SERPL-SCNC: 98 MMOL/L (ref 96–112)
CO2 SERPL-SCNC: 27 MMOL/L (ref 20–33)
CREAT SERPL-MCNC: 0.98 MG/DL (ref 0.5–1.4)
EOSINOPHIL # BLD AUTO: 0.28 K/UL (ref 0–0.51)
EOSINOPHIL NFR BLD: 2.9 % (ref 0–6.9)
ERYTHROCYTE [DISTWIDTH] IN BLOOD BY AUTOMATED COUNT: 37.5 FL (ref 35.9–50)
GFR SERPLBLD CREATININE-BSD FMLA CKD-EPI: 99 ML/MIN/1.73 M 2
GLOBULIN SER CALC-MCNC: 2.6 G/DL (ref 1.9–3.5)
GLUCOSE SERPL-MCNC: 133 MG/DL (ref 65–99)
HCT VFR BLD AUTO: 50 % (ref 42–52)
HGB BLD-MCNC: 17.6 G/DL (ref 14–18)
IMM GRANULOCYTES # BLD AUTO: 0.04 K/UL (ref 0–0.11)
IMM GRANULOCYTES NFR BLD AUTO: 0.4 % (ref 0–0.9)
LYMPHOCYTES # BLD AUTO: 1.94 K/UL (ref 1–4.8)
LYMPHOCYTES NFR BLD: 20.2 % (ref 22–41)
MCH RBC QN AUTO: 30.1 PG (ref 27–33)
MCHC RBC AUTO-ENTMCNC: 35.2 G/DL (ref 32.3–36.5)
MCV RBC AUTO: 85.5 FL (ref 81.4–97.8)
MONOCYTES # BLD AUTO: 0.71 K/UL (ref 0–0.85)
MONOCYTES NFR BLD AUTO: 7.4 % (ref 0–13.4)
NEUTROPHILS # BLD AUTO: 6.52 K/UL (ref 1.82–7.42)
NEUTROPHILS NFR BLD: 67.7 % (ref 44–72)
NRBC # BLD AUTO: 0 K/UL
NRBC BLD-RTO: 0 /100 WBC (ref 0–0.2)
PLATELET # BLD AUTO: 282 K/UL (ref 164–446)
PMV BLD AUTO: 9.1 FL (ref 9–12.9)
POTASSIUM SERPL-SCNC: 3.7 MMOL/L (ref 3.6–5.5)
PROT SERPL-MCNC: 6.8 G/DL (ref 6–8.2)
RBC # BLD AUTO: 5.85 M/UL (ref 4.7–6.1)
SODIUM SERPL-SCNC: 136 MMOL/L (ref 135–145)
TROPONIN T SERPL-MCNC: 7 NG/L (ref 6–19)
WBC # BLD AUTO: 9.6 K/UL (ref 4.8–10.8)

## 2023-09-23 PROCEDURE — 99283 EMERGENCY DEPT VISIT LOW MDM: CPT

## 2023-09-23 PROCEDURE — 85025 COMPLETE CBC W/AUTO DIFF WBC: CPT

## 2023-09-23 PROCEDURE — 93005 ELECTROCARDIOGRAM TRACING: CPT

## 2023-09-23 PROCEDURE — 80053 COMPREHEN METABOLIC PANEL: CPT

## 2023-09-23 PROCEDURE — 84484 ASSAY OF TROPONIN QUANT: CPT

## 2023-09-23 PROCEDURE — 36415 COLL VENOUS BLD VENIPUNCTURE: CPT

## 2023-09-23 PROCEDURE — 93005 ELECTROCARDIOGRAM TRACING: CPT | Performed by: STUDENT IN AN ORGANIZED HEALTH CARE EDUCATION/TRAINING PROGRAM

## 2023-09-23 ASSESSMENT — LIFESTYLE VARIABLES: DO YOU DRINK ALCOHOL: YES

## 2023-09-24 LAB — EKG IMPRESSION: NORMAL

## 2023-09-24 NOTE — ED NOTES
Pt in room safely./ ambulatory w/ steady gait. Reports hx of panic attacks/anxiety. Pt stated consumption of cocaine and etoh after leaving work this morning. Palpitations started at around 2000 today after dinner. Pt is calm and cooperative at this itme. Ekg in chart.

## 2023-09-24 NOTE — DISCHARGE INSTRUCTIONS
There is in the emergency room for evaluation of palpitations.  Your EKG shows that you are in a normal heart rhythm.  Please follow-up with your primary care doctor for further testing.  Return to the emergency room for any crushing chest pain, shortness of breath or any other concerns.

## 2023-09-24 NOTE — ED TRIAGE NOTES
Chief Complaint   Patient presents with    Palpitations    Dizziness     Since today  Denied any chest pain or shortness of breath  He said he has history of panic attack       Pain: 0/10    Pt came in to triage ambulatory with steady gait for the above complaints.     Pt is alert and oriented x 4, speaking in full sentences, follows commands and responds appropriately to questions.     Respirations are even and unlabored.    Pt placed in lobby. Pt educated on triage process.     Pt encouraged to inform staff for any changes in condition or if needs help while waiting to be room in.    Vitals:    09/23/23 2143   BP: 122/85   Pulse: 97   Resp: 18   Temp: 36.7 °C (98.1 °F)   SpO2: 98%

## 2023-09-24 NOTE — ED PROVIDER NOTES
ED Provider Note    CHIEF COMPLAINT  Chief Complaint   Patient presents with    Palpitations    Dizziness     Since today  Denied any chest pain or shortness of breath  He said he has history of panic attack       EXTERNAL RECORDS REVIEWED  Other patient last seen in the emergency room on August 18, 2023 for complaint of rash    HPI/ROS  LIMITATION TO HISTORY   Select: : None  OUTSIDE HISTORIAN(S):  None    Jori Brooks is a 43 y.o. male who presents after an episode of palpitations and dizziness which occurred at 9 PM.  He states that he works overnights as a .  After work this morning he drank some alcohol and using cocaine.  He was fine but went to work this evening.  He ate a hamburger and after eating a hamburger he was sitting in his car.  He then felt that his heart was racing and he felt slightly dizzy.  He decided come emergency room to get checked out.  He has no chest pain, shortness of breath, abdominal pain, nausea, vomiting, numbness, tingling, weakness.  He states that his symptoms feel similar to previous panic attack.  He has a history of panic disorder but is not on any medication.  He otherwise has no history of hypertension, hyperlipidemia, diabetes.  There is no family history of heart attack.  There is no family history of sudden cardiac death.  The patient has no history of arrhythmia.  He states that he is currently feeling better.    PAST MEDICAL HISTORY   has a past medical history of Anxiety.    SURGICAL HISTORY  patient denies any surgical history    FAMILY HISTORY  History reviewed. No pertinent family history.    SOCIAL HISTORY  Social History     Tobacco Use    Smoking status: Every Day     Current packs/day: 0.50     Types: Cigarettes    Smokeless tobacco: Never   Vaping Use    Vaping Use: Never used   Substance and Sexual Activity    Alcohol use: Yes    Drug use: Yes     Comment: cocaine    Sexual activity: Not on file       CURRENT MEDICATIONS  Home Medications        "Reviewed by Lorenzo Chester R.N. (Registered Nurse) on 09/23/23 at 2145  Med List Status: Partial     Medication Last Dose Status        Patient Seth Taking any Medications                           ALLERGIES  Allergies   Allergen Reactions    Sulfa Drugs Rash     When 11 years old       PHYSICAL EXAM  VITAL SIGNS: /85   Pulse 97   Temp 36.7 °C (98.1 °F) (Temporal)   Resp 18   Ht 1.803 m (5' 11\")   Wt 87.1 kg (192 lb)   SpO2 98%   BMI 26.78 kg/m²      Constitutional: Well developed, Well nourished, No acute respiratory distress, Non-toxic appearance.   HENT: Normocephalic, Atraumatic, Bilateral external ears normal, Oropharynx clear, mucous membranes are moist.  Eyes: Conjunctiva normal, No discharge. No icterus.  Neck: Normal range of motion. Supple.  Cardiovascular: Normal heart rate, Normal rhythm, No murmurs, No rubs, No gallops.   Thorax & Lungs: Clear to auscultation bilaterally, No respiratory distress, No wheezing.  Abdomen: Soft nontender normal bowel sounds no rebound masses or peritoneal signs  Skin: Warm, Dry, No erythema, No rash.   Extremities: Intact distal pulses, No edema, No tenderness  Musculoskeletal: Good range of motion in all major joints. Normal gait.  Neurologic: Alert & oriented x 3.  Strength 5 out of 5 in bilateral upper and lower extremities, sensation intact to light touch, cranial nerves II through XII intact.  No pronator drift.  Normal speech  Psych: Anxious      DIAGNOSTIC STUDIES / PROCEDURES      LABS/EKG  Results for orders placed or performed during the hospital encounter of 09/23/23   CBC WITH DIFFERENTIAL   Result Value Ref Range    WBC 9.6 4.8 - 10.8 K/uL    RBC 5.85 4.70 - 6.10 M/uL    Hemoglobin 17.6 14.0 - 18.0 g/dL    Hematocrit 50.0 42.0 - 52.0 %    MCV 85.5 81.4 - 97.8 fL    MCH 30.1 27.0 - 33.0 pg    MCHC 35.2 32.3 - 36.5 g/dL    RDW 37.5 35.9 - 50.0 fL    Platelet Count 282 164 - 446 K/uL    MPV 9.1 9.0 - 12.9 fL    Neutrophils-Polys 67.70 44.00 - 72.00 " %    Lymphocytes 20.20 (L) 22.00 - 41.00 %    Monocytes 7.40 0.00 - 13.40 %    Eosinophils 2.90 0.00 - 6.90 %    Basophils 1.40 0.00 - 1.80 %    Immature Granulocytes 0.40 0.00 - 0.90 %    Nucleated RBC 0.00 0.00 - 0.20 /100 WBC    Neutrophils (Absolute) 6.52 1.82 - 7.42 K/uL    Lymphs (Absolute) 1.94 1.00 - 4.80 K/uL    Monos (Absolute) 0.71 0.00 - 0.85 K/uL    Eos (Absolute) 0.28 0.00 - 0.51 K/uL    Baso (Absolute) 0.13 (H) 0.00 - 0.12 K/uL    Immature Granulocytes (abs) 0.04 0.00 - 0.11 K/uL    NRBC (Absolute) 0.00 K/uL   COMP METABOLIC PANEL   Result Value Ref Range    Sodium 136 135 - 145 mmol/L    Potassium 3.7 3.6 - 5.5 mmol/L    Chloride 98 96 - 112 mmol/L    Co2 27 20 - 33 mmol/L    Anion Gap 11.0 7.0 - 16.0    Glucose 133 (H) 65 - 99 mg/dL    Bun 11 8 - 22 mg/dL    Creatinine 0.98 0.50 - 1.40 mg/dL    Calcium 9.1 8.5 - 10.5 mg/dL    Correct Calcium 8.9 8.5 - 10.5 mg/dL    AST(SGOT) 19 12 - 45 U/L    ALT(SGPT) 18 2 - 50 U/L    Alkaline Phosphatase 66 30 - 99 U/L    Total Bilirubin 0.5 0.1 - 1.5 mg/dL    Albumin 4.2 3.2 - 4.9 g/dL    Total Protein 6.8 6.0 - 8.2 g/dL    Globulin 2.6 1.9 - 3.5 g/dL    A-G Ratio 1.6 g/dL   TROPONIN   Result Value Ref Range    Troponin T 7 6 - 19 ng/L   ESTIMATED GFR   Result Value Ref Range    GFR (CKD-EPI) 99 >60 mL/min/1.73 m 2   EKG   Result Value Ref Range    Report       [facility] Emergency Dept.    Test Date:  2023  Pt Name:    LYNDON SHEN              Department: ER  MRN:        [id number]                      Room:  Gender:     Male                         Technician: [id number]  :        1980                   Requested By:ER TRIAGE PROTOCOL  Order #:    [id number]                    Reading MD:    Measurements  Intervals                                Axis  Rate:       95                           P:          49  DE:         132                          QRS:        76  QRSD:       105                          T:          35  QT:          348  QTc:        438    Interpretive Statements  Sinus rhythm  No previous ECG available for comparison         I have independently interpreted this EKG          COURSE & MEDICAL DECISION MAKING    ED Observation Status? No; Patient does not meet criteria for ED Observation.     INITIAL ASSESSMENT, COURSE AND PLAN  Care Narrative: This is a 43-year-old male who presents after an episode of palpitations which has since resolved that occurred earlier this evening.  On arrival his vital signs are stable.  He is nontoxic in appearance.  He has a normal neurologic exam.    EKG without arrhythmia or prolonged QT.  It is very reassuring.  His labs show no leukocytosis.  He is not anemic.  His electrolytes are within normal limits.  His kidney function is normal.  His glucose is within acceptable limits.  His troponin is 7 and he has no chest pain, no acute ischemic changes on EKG.  I do not suspect ACS.  I did consider PE however patient is ruled out with PERC rule.  He has a normal neurologic exam therefore I doubt CVA.    Patient at this point is asymptomatic.  Given reassuring work-up, I do think that patient is stable for discharge home.  I have advised him to follow-up with his primary care doctor for further testing such as consideration of Holter monitor.  Strict ER return precautions were discussed.  Patient is agreeable to discharge plan with no further questions.            DISPOSITION AND DISCUSSIONS  Patient discharged home in stable condition with instructions to follow-up with primary care doctor.  Strict ER return precautions discussed.  Patient is agreeable to discharge plan with no further questions.    Decision tools and prescription drugs considered including, but not limited to: PERC rule which applies to patient therefore PE ruled out .    FINAL DIAGNOSIS  1. Palpitations           Electronically signed by: Zabrina Alvarenga M.D., 9/23/2023 10:36 PM

## 2024-03-06 ENCOUNTER — HOSPITAL ENCOUNTER (EMERGENCY)
Facility: MEDICAL CENTER | Age: 44
End: 2024-03-06
Attending: EMERGENCY MEDICINE

## 2024-03-06 ENCOUNTER — APPOINTMENT (OUTPATIENT)
Dept: RADIOLOGY | Facility: MEDICAL CENTER | Age: 44
End: 2024-03-06
Attending: EMERGENCY MEDICINE

## 2024-03-06 VITALS
HEART RATE: 87 BPM | HEIGHT: 71 IN | TEMPERATURE: 98.3 F | OXYGEN SATURATION: 92 % | WEIGHT: 216.05 LBS | SYSTOLIC BLOOD PRESSURE: 148 MMHG | BODY MASS INDEX: 30.25 KG/M2 | RESPIRATION RATE: 14 BRPM | DIASTOLIC BLOOD PRESSURE: 81 MMHG

## 2024-03-06 DIAGNOSIS — R00.2 PALPITATIONS: ICD-10-CM

## 2024-03-06 DIAGNOSIS — R06.02 SHORTNESS OF BREATH: ICD-10-CM

## 2024-03-06 DIAGNOSIS — F10.929 ALCOHOLIC INTOXICATION WITH COMPLICATION (HCC): ICD-10-CM

## 2024-03-06 DIAGNOSIS — R07.89 CHEST DISCOMFORT: ICD-10-CM

## 2024-03-06 DIAGNOSIS — F14.90 COCAINE USE: ICD-10-CM

## 2024-03-06 LAB
ALBUMIN SERPL BCP-MCNC: 4.6 G/DL (ref 3.2–4.9)
ALBUMIN/GLOB SERPL: 1.5 G/DL
ALP SERPL-CCNC: 83 U/L (ref 30–99)
ALT SERPL-CCNC: 17 U/L (ref 2–50)
ANION GAP SERPL CALC-SCNC: 16 MMOL/L (ref 7–16)
AST SERPL-CCNC: 20 U/L (ref 12–45)
BASOPHILS # BLD AUTO: 1.5 % (ref 0–1.8)
BASOPHILS # BLD: 0.13 K/UL (ref 0–0.12)
BILIRUB SERPL-MCNC: 0.6 MG/DL (ref 0.1–1.5)
BUN SERPL-MCNC: 9 MG/DL (ref 8–22)
CALCIUM ALBUM COR SERPL-MCNC: 8.7 MG/DL (ref 8.5–10.5)
CALCIUM SERPL-MCNC: 9.2 MG/DL (ref 8.5–10.5)
CHLORIDE SERPL-SCNC: 99 MMOL/L (ref 96–112)
CO2 SERPL-SCNC: 22 MMOL/L (ref 20–33)
CREAT SERPL-MCNC: 0.83 MG/DL (ref 0.5–1.4)
D DIMER PPP IA.FEU-MCNC: <0.27 UG/ML (FEU) (ref 0–0.5)
EKG IMPRESSION: NORMAL
EOSINOPHIL # BLD AUTO: 0.25 K/UL (ref 0–0.51)
EOSINOPHIL NFR BLD: 2.8 % (ref 0–6.9)
ERYTHROCYTE [DISTWIDTH] IN BLOOD BY AUTOMATED COUNT: 38.1 FL (ref 35.9–50)
ETHANOL BLD-MCNC: 118.8 MG/DL
ETHANOL BLD-MCNC: 79.4 MG/DL
GFR SERPLBLD CREATININE-BSD FMLA CKD-EPI: 112 ML/MIN/1.73 M 2
GLOBULIN SER CALC-MCNC: 3 G/DL (ref 1.9–3.5)
GLUCOSE SERPL-MCNC: 105 MG/DL (ref 65–99)
HCT VFR BLD AUTO: 51.1 % (ref 42–52)
HGB BLD-MCNC: 18.5 G/DL (ref 14–18)
IMM GRANULOCYTES # BLD AUTO: 0.02 K/UL (ref 0–0.11)
IMM GRANULOCYTES NFR BLD AUTO: 0.2 % (ref 0–0.9)
LIPASE SERPL-CCNC: 278 U/L (ref 11–82)
LYMPHOCYTES # BLD AUTO: 2.07 K/UL (ref 1–4.8)
LYMPHOCYTES NFR BLD: 23.5 % (ref 22–41)
MCH RBC QN AUTO: 30.8 PG (ref 27–33)
MCHC RBC AUTO-ENTMCNC: 36.2 G/DL (ref 32.3–36.5)
MCV RBC AUTO: 85.2 FL (ref 81.4–97.8)
MONOCYTES # BLD AUTO: 0.77 K/UL (ref 0–0.85)
MONOCYTES NFR BLD AUTO: 8.7 % (ref 0–13.4)
NEUTROPHILS # BLD AUTO: 5.57 K/UL (ref 1.82–7.42)
NEUTROPHILS NFR BLD: 63.3 % (ref 44–72)
NRBC # BLD AUTO: 0 K/UL
NRBC BLD-RTO: 0 /100 WBC (ref 0–0.2)
PLATELET # BLD AUTO: 259 K/UL (ref 164–446)
PMV BLD AUTO: 9.2 FL (ref 9–12.9)
POTASSIUM SERPL-SCNC: 4.1 MMOL/L (ref 3.6–5.5)
PROT SERPL-MCNC: 7.6 G/DL (ref 6–8.2)
RBC # BLD AUTO: 6 M/UL (ref 4.7–6.1)
SODIUM SERPL-SCNC: 137 MMOL/L (ref 135–145)
TROPONIN T SERPL-MCNC: 8 NG/L (ref 6–19)
TROPONIN T SERPL-MCNC: 9 NG/L (ref 6–19)
WBC # BLD AUTO: 8.8 K/UL (ref 4.8–10.8)

## 2024-03-06 PROCEDURE — 71045 X-RAY EXAM CHEST 1 VIEW: CPT

## 2024-03-06 PROCEDURE — 700105 HCHG RX REV CODE 258: Performed by: EMERGENCY MEDICINE

## 2024-03-06 PROCEDURE — 85025 COMPLETE CBC W/AUTO DIFF WBC: CPT

## 2024-03-06 PROCEDURE — 93005 ELECTROCARDIOGRAM TRACING: CPT

## 2024-03-06 PROCEDURE — 99285 EMERGENCY DEPT VISIT HI MDM: CPT

## 2024-03-06 PROCEDURE — 93005 ELECTROCARDIOGRAM TRACING: CPT | Performed by: EMERGENCY MEDICINE

## 2024-03-06 PROCEDURE — A9270 NON-COVERED ITEM OR SERVICE: HCPCS | Performed by: EMERGENCY MEDICINE

## 2024-03-06 PROCEDURE — 36415 COLL VENOUS BLD VENIPUNCTURE: CPT

## 2024-03-06 PROCEDURE — 80053 COMPREHEN METABOLIC PANEL: CPT

## 2024-03-06 PROCEDURE — 82077 ASSAY SPEC XCP UR&BREATH IA: CPT

## 2024-03-06 PROCEDURE — 83690 ASSAY OF LIPASE: CPT

## 2024-03-06 PROCEDURE — 700102 HCHG RX REV CODE 250 W/ 637 OVERRIDE(OP): Performed by: EMERGENCY MEDICINE

## 2024-03-06 PROCEDURE — 84484 ASSAY OF TROPONIN QUANT: CPT

## 2024-03-06 PROCEDURE — 85379 FIBRIN DEGRADATION QUANT: CPT

## 2024-03-06 RX ORDER — SODIUM CHLORIDE, SODIUM LACTATE, POTASSIUM CHLORIDE, CALCIUM CHLORIDE 600; 310; 30; 20 MG/100ML; MG/100ML; MG/100ML; MG/100ML
1000 INJECTION, SOLUTION INTRAVENOUS ONCE
Status: COMPLETED | OUTPATIENT
Start: 2024-03-06 | End: 2024-03-06

## 2024-03-06 RX ORDER — DIAZEPAM 5 MG/1
5 TABLET ORAL ONCE
Status: COMPLETED | OUTPATIENT
Start: 2024-03-06 | End: 2024-03-06

## 2024-03-06 RX ADMIN — DIAZEPAM 5 MG: 5 TABLET ORAL at 03:52

## 2024-03-06 RX ADMIN — SODIUM CHLORIDE, POTASSIUM CHLORIDE, SODIUM LACTATE AND CALCIUM CHLORIDE 1000 ML: 600; 310; 30; 20 INJECTION, SOLUTION INTRAVENOUS at 03:26

## 2024-03-06 ASSESSMENT — FIBROSIS 4 INDEX: FIB4 SCORE: 0.67

## 2024-03-06 NOTE — ED PROVIDER NOTES
"ED Provider Note    CHIEF COMPLAINT  Chief Complaint   Patient presents with    Drug Ingestion     Patient ambulates to triage reports ingesting to much cocaine, reports approximately \"1 finger line an hour\" over the last six hours, reports sob, increased HR, and feeling hot.      EXTERNAL RECORDS REVIEWED  Prior ER note from 9/23/23 where pt was seen for some dizziness, palpitations following alcohol and cocaine use.  No known history of hypertension, dyslipidemia or diabetes.  No reported family history of myocardial disease or history of sudden cardiac death.  Workup during that time showed reassuring exam, hemoconcentration, no gross electrolyte abnormalities, nondetectable troponin and no concerning EKG changes.    HPI/ROS  LIMITATION TO HISTORY   Select: : None  OUTSIDE HISTORIAN(S):  none    Jori Brooks is a 43 y.o. male who presents urgency room for concerns regarding ongoing chest pain, shortness of breath following use of cocaine and alcohol earlier tonight.  Patient did use multiple lines of cocaine and has had some drinks prior to using.  This was happening in the felt flushed, felt some palpitations and stood up and feel acutely short of breath.  He had odd sensations and felt like he might pass out.  Sitting at the bedside right now he still feels like he has some element of shortness of breath.  He says this is more severe than some of the symptoms he had previously.  He is a fairly regular cocaine user, says he does not normally drink when he uses.  While he was tachycardic in triage she is having a resting heart rate of 90 at this point.  He had had some element of hypoxia that quickly corrected with 2 L via nasal cannula.  He denies any vomiting, no recent exertional chest discomfort or syncope, no lower extremity swelling no prior history of DVT or PE.    PAST MEDICAL HISTORY   has a past medical history of Anxiety.    SURGICAL HISTORY  patient denies any surgical history    FAMILY HISTORY  No " "family history on file.    SOCIAL HISTORY  Social History     Tobacco Use    Smoking status: Every Day     Current packs/day: 0.50     Types: Cigarettes    Smokeless tobacco: Never   Vaping Use    Vaping Use: Never used   Substance and Sexual Activity    Alcohol use: Yes    Drug use: Yes     Comment: cocaine    Sexual activity: Not on file     CURRENT MEDICATIONS  Home Medications       Reviewed by Hilary lFynn R.N. (Registered Nurse) on 03/06/24 at 0229  Med List Status: Not Addressed     Medication Last Dose Status        Patient Seth Taking any Medications                         ALLERGIES  Allergies   Allergen Reactions    Sulfa Drugs Rash     When 11 years old     PHYSICAL EXAM  VITAL SIGNS: BP (!) 148/81   Pulse 84   Temp 36 °C (96.8 °F) (Temporal)   Resp 14   Ht 1.803 m (5' 11\")   Wt 98 kg (216 lb 0.8 oz)   SpO2 93%   BMI 30.13 kg/m²    Genl: M sitting in gurney uncomfortably, speaking clearly, appears in mild distress   Head: NC/AT   ENT: Mucous membranes slightly dry posterior pharynx clear, uvula midline, nares patent bilaterally   Eyes: Normal sclera, pupils equal round reactive to light  Neck: Supple, FROM, no LAD appreciated   Pulmonary: Lungs are clear to auscultation bilaterally  Chest: No TTP  CV:  tachycardia, no murmur appreciated, pulses 2+ in both upper and lower extremities,  Abdomen: soft, NT/ND; no rebound/guarding, no masses palpated, no HSM   : no CVA or suprapubic tenderness   Musculoskeletal: Pain free ROM of the neck. Moving upper and lower extremities in spontaneous and coordinated fashion  Neuro: A&Ox4 (person, place, time, situation), speech fluent, gait steady, no focal deficits appreciated, No cerebellar signs. Sensation is grossly intact in the distal upper and lower extremities.  5/5 strength in  and dorsiflexion/plantar flexion of the ankles  Psych: Patient has an appropriate affect and behavior  Skin: No rash or lesions.  No pallor or jaundice.  No cyanosis.  " Warm and dry.     DIAGNOSTIC STUDIES / PROCEDURES  EKG  I have independently interpreted this EKG  Results for orders placed or performed during the hospital encounter of 24   EKG   Result Value Ref Range    Report       [facility] Emergency Dept.    Test Date:  2024  Pt Name:    LYNDON SHEN              Department: ER  MRN:        [id number]                      Room:  Gender:     Male                         Technician: [id number]  :        1980                   Requested By:ER TRIAGE PROTOCOL  Order #:    [id number]                    Reading MD: Evan Stevens MD    Measurements  Intervals                                Axis  Rate:       85                           P:          33  GA:         127                          QRS:        -20  QRSD:       105                          T:          13  QT:         376  QTc:        447    Interpretive Statements  Sinus rhythm, rate of 85, normal intervals, Borderline left axis deviation,  no acute ischemia or infarction, lateral t wave flattening compared to prior  dated 2023  Electronically Signed On 2024 03:05:58 PST by Evan Stevens MD       LABS  Labs Reviewed   CBC WITH DIFFERENTIAL - Abnormal; Notable for the following components:       Result Value    Hemoglobin 18.5 (*)     Baso (Absolute) 0.13 (*)     All other components within normal limits    Narrative:     Biotin intake of greater than 5 mg per day may interfere with  troponin levels, causing false low values.   COMP METABOLIC PANEL - Abnormal; Notable for the following components:    Glucose 105 (*)     All other components within normal limits    Narrative:     Biotin intake of greater than 5 mg per day may interfere with  troponin levels, causing false low values.   LIPASE - Abnormal; Notable for the following components:    Lipase 278 (*)     All other components within normal limits    Narrative:     Biotin intake of greater than 5 mg per day may interfere  with  troponin levels, causing false low values.   DIAGNOSTIC ALCOHOL - Abnormal; Notable for the following components:    Diagnostic Alcohol 79.4 (*)     All other components within normal limits   DIAGNOSTIC ALCOHOL - Abnormal; Notable for the following components:    Diagnostic Alcohol 118.8 (*)     All other components within normal limits    Narrative:     Biotin intake of greater than 5 mg per day may interfere with  troponin levels, causing false low values.   TROPONIN    Narrative:     Biotin intake of greater than 5 mg per day may interfere with  troponin levels, causing false low values.   D-DIMER   ESTIMATED GFR    Narrative:     Biotin intake of greater than 5 mg per day may interfere with  troponin levels, causing false low values.   TROPONIN     RADIOLOGY  I have independently interpreted the diagnostic imaging associated with this visit and am waiting the final reading from the radiologist.   My preliminary interpretation is as follows: No focal infiltrates, no cardiomegaly.  Radiologist interpretation:   DX-CHEST-PORTABLE (1 VIEW)   Final Result      No acute process.        COURSE & MEDICAL DECISION MAKING    ED Observation Status? No; Patient does not meet criteria for ED Observation.     INITIAL ASSESSMENT, COURSE AND PLAN  ACS  Pneumothorax  Pulmonary embolism  Cocaine use/cocaine Sharon  Pneumonia  Tachyarrhythmia  Pneumonitis  Pancreatitis  Esophagitis  Anxiety    Care Narrative: Patient presents with a chief complaint of specific chest discomfort and shortness of breath following several repeat episodes of cocaine ingestion in the setting of alcohol use.  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  He does not have a history of PE, he does not have a history of recent bacterial infection and has no cardiovascular history or familial history of sudden cardiac death.  IV access  was established, initial dose of benzodiazepines were given and lab work obtained for differential as noted above.  Chest x-ray shows no focal infiltrates, no cardiomegaly does not appear to be in acute heart failure.  Initial troponin was not elevated and delta troponin 2 hours later is also not improved.  Cannot be cleared by PERC criteria based on his tachycardia on arrival but D-dimer is not elevated and with a likely history I do not believe that this is likely.  No advanced medical imaging is further pursued.  Following fluids and Valium patient's vital signs are improving, resting heart rate in the low 80s with no hypoxia and is titrated off of oxygen.  There is no ARA, lipase is detectable at 278 but he does not have signs of acute obstructive hepatobiliary process nor is he has substantial abdominal pain.    I spent extensive time at the bedside counseling the patient regarding his cocaine use and he is not ready to go to counseling.  He is willing to get resources at this time and he is understanding that the coingestions of alcohol and cocaine is very dangerous and can likely lead to permanent disability and/or death.    HYDRATION: Based on the patient's presentation of Tachycardia the patient was given IV fluids. IV Hydration was used because oral hydration was not adequate alone. Upon recheck following hydration, the patient was improving.    DISPOSITION AND DISCUSSIONS  I have discussed management of the patient with the following physicians and IRENE's:  none    Discussion of management with other QHP or appropriate source(s): None     Escalation of care considered, and ultimately not performed:acute inpatient care management, however at this time, the patient is most appropriate for outpatient management    Barriers to care at this time, including but not limited to: Patient does not have established PCP.     Decision tools and prescription drugs considered including, but not limited to: none.    FINAL  DIAGNOSIS  1. Cocaine use    2. Alcoholic intoxication with complication (HCC)    3. Palpitations    4. Chest discomfort    5. Shortness of breath      Electronically signed by: Rodney Gordon M.D., 3/6/2024 3:04 AM

## 2024-03-06 NOTE — ED TRIAGE NOTES
"Chief Complaint   Patient presents with    Drug Ingestion     Patient ambulates to triage reports ingesting to much cocaine, reports approximately \"1 finger line an hour\" over the last six hours, reports sob, increased HR, and feeling hot.        BP (!) 147/90   Pulse (!) 103   Temp 36 °C (96.8 °F) (Temporal)   Resp 20   Ht 1.803 m (5' 11\")   Wt 98 kg (216 lb 0.8 oz)   SpO2 100%     Patient educated on ed triage process, instructed to notify staff of any new or worsening symptoms, verbalizes understanding. Patient returned to ed lobby, apologized for wait times.     "

## 2024-03-06 NOTE — ED NOTES
Pt resting comfortably, respirations even and unlabored. NAD noted. VSS. Pt denies any needs at this time. Call light within reach

## 2024-09-02 ENCOUNTER — HOSPITAL ENCOUNTER (EMERGENCY)
Facility: MEDICAL CENTER | Age: 44
End: 2024-09-02
Attending: EMERGENCY MEDICINE

## 2024-09-02 VITALS
RESPIRATION RATE: 18 BRPM | BODY MASS INDEX: 25.28 KG/M2 | DIASTOLIC BLOOD PRESSURE: 88 MMHG | HEIGHT: 71 IN | OXYGEN SATURATION: 99 % | HEART RATE: 85 BPM | TEMPERATURE: 98 F | SYSTOLIC BLOOD PRESSURE: 142 MMHG | WEIGHT: 180.56 LBS

## 2024-09-02 DIAGNOSIS — K02.9 DENTAL CARIES: ICD-10-CM

## 2024-09-02 DIAGNOSIS — K08.89 DENTALGIA: ICD-10-CM

## 2024-09-02 PROCEDURE — 700102 HCHG RX REV CODE 250 W/ 637 OVERRIDE(OP): Performed by: EMERGENCY MEDICINE

## 2024-09-02 PROCEDURE — 99283 EMERGENCY DEPT VISIT LOW MDM: CPT

## 2024-09-02 PROCEDURE — A9270 NON-COVERED ITEM OR SERVICE: HCPCS | Performed by: EMERGENCY MEDICINE

## 2024-09-02 RX ORDER — AMOXICILLIN 500 MG/1
500 CAPSULE ORAL ONCE
Status: COMPLETED | OUTPATIENT
Start: 2024-09-02 | End: 2024-09-02

## 2024-09-02 RX ORDER — OXYCODONE AND ACETAMINOPHEN 5; 325 MG/1; MG/1
1 TABLET ORAL ONCE
Status: COMPLETED | OUTPATIENT
Start: 2024-09-02 | End: 2024-09-02

## 2024-09-02 RX ORDER — AMOXICILLIN 500 MG/1
500 CAPSULE ORAL 3 TIMES DAILY
Qty: 21 CAPSULE | Refills: 0 | Status: ACTIVE | OUTPATIENT
Start: 2024-09-02 | End: 2024-09-09

## 2024-09-02 RX ORDER — OXYCODONE AND ACETAMINOPHEN 5; 325 MG/1; MG/1
1 TABLET ORAL EVERY 8 HOURS PRN
Qty: 12 TABLET | Refills: 0 | Status: SHIPPED | OUTPATIENT
Start: 2024-09-02 | End: 2024-09-06

## 2024-09-02 RX ADMIN — AMOXICILLIN 500 MG: 500 CAPSULE ORAL at 11:10

## 2024-09-02 RX ADMIN — OXYCODONE HYDROCHLORIDE AND ACETAMINOPHEN 1 TABLET: 5; 325 TABLET ORAL at 11:10

## 2024-09-02 ASSESSMENT — PAIN DESCRIPTION - PAIN TYPE: TYPE: ACUTE PAIN

## 2024-09-02 ASSESSMENT — FIBROSIS 4 INDEX: FIB4 SCORE: 0.81

## 2024-09-02 NOTE — ED NOTES
Pt ambulated to [facility] 2 from the lobby with a steady gait. Pt placed on the monitor for simple vitals. Agree with triage note. Chart up for ERP.

## 2024-09-02 NOTE — ED NOTES
"Pt discharged home. Pt in possession of belongings. Pt provided discharge education and information pertaining to medications and follow up appointments. Pt received copy of discharge instructions and verbalized understanding. BP (!) 142/88   Pulse 85   Temp 36.7 °C (98 °F) (Temporal)   Resp 18   Ht 1.803 m (5' 11\")   Wt 81.9 kg (180 lb 8.9 oz)   SpO2 99%   BMI 25.18 kg/m²     "

## 2024-09-02 NOTE — ED PROVIDER NOTES
ED Provider Note    CHIEF COMPLAINT  Chief Complaint   Patient presents with    Dental Pain     To top left tooth since yesterday, concerned there may now be an abscess       EXTERNAL RECORDS REVIEWED  Patient's last encounter was in the emergency department in March of this year.  He was seen for drug ingestion.  States he took too much cocaine over the last several hours.  He was noted to also be intoxicated with alcohol, experiencing palpitations and chest discomfort.    Prior to that patient was seen in the emergency department with palpitations and dizziness in September 2023.  Workup was unrevealing he was discharged to home.    HPI/ROS  LIMITATION TO HISTORY   Select: : None  OUTSIDE HISTORIAN(S):  none    Jori Brooks is a 44 y.o. male who presents to the emergency department with a chief complaint of dental pain, top left.  She states it started yesterday, worsened today.  He is concerned that there is an abscess.  The tooth is partially decayed.  He has no swelling to the floor of his mouth.  He is managing his own secretions.  There is no facial swelling or submandibular swelling.  He denies any fever.    PAST MEDICAL HISTORY   has a past medical history of Anxiety.    SURGICAL HISTORY  patient denies any surgical history    FAMILY HISTORY  History reviewed. No pertinent family history.    SOCIAL HISTORY  Social History     Tobacco Use    Smoking status: Every Day     Current packs/day: 0.50     Types: Cigarettes    Smokeless tobacco: Never   Vaping Use    Vaping status: Never Used   Substance and Sexual Activity    Alcohol use: Yes    Drug use: Yes     Comment: cocaine    Sexual activity: Not on file       CURRENT MEDICATIONS  Home Medications       Reviewed by Rachlele Gray R.N. (Registered Nurse) on 09/02/24 at 1030  Med List Status: Partial     Medication Last Dose Status        Patient Seth Taking any Medications                           ALLERGIES  Allergies   Allergen Reactions    Sulfa Drugs  "Rash     When 11 years old       PHYSICAL EXAM  VITAL SIGNS: BP (!) 135/95   Pulse 96   Temp 36.6 °C (97.9 °F) (Temporal)   Resp 18   Ht 1.803 m (5' 11\")   Wt 81.9 kg (180 lb 8.9 oz)   SpO2 98%   BMI 25.18 kg/m²    Vitals reviewed.  Constitutional: Patient is oriented to person, place, and time. Appears well-developed and well-nourished. Mild distress. Very Anxious.   Head: Normocephalic and atraumatic.   Mouth/Throat: Oropharynx is clear and moist, no exudates.  No swelling to the floor of the mouth.  He is managing his own secretions.  No submandibular swelling.  No swelling to the buccal surfaces.  There is a small bump overlying the, on the buccal surface of the upper, left first molar.  Eyes: Conjunctivae are normal.   Neck: Normal range of motion. Neck supple.   Cardiovascular: Normal rate  Pulmonary/Chest: Effort cally. No respiratory distress  Lymphadenopathy: No cervical adenopathy.   Neurological: Normal gait. No focal deficits.   Skin: Skin is warm and dry. No erythema. No pallor.   Psychiatric: Anxious    EKG/LABS    RADIOLOGY/PROCEDURES     COURSE & MEDICAL DECISION MAKING    ASSESSMENT, COURSE AND PLAN  Care Narrative:     This is a well-appearing albeit anxious, 44-year-old male.  He presents with now the second day of left upper dental pain.  The tooth in question is partially decayed.  There is a small, 2 to 3 mm bump on the lateral aspect of the tooth, overlying the gum.  No gum erythema.  No facial swelling.  I do not see indication for incision and drainage at this time.  I do think he would benefit from pain medication and antibiotics.  He is encouraged to see a dentist.  He is discharged home with these medications as well.   was checked.  There is no concerning prescription filling pattern.    ADDITIONAL PROBLEMS MANAGED    DISPOSITION AND DISCUSSIONS  I have discussed management of the patient with the following physicians and IRENE's:  None    Discussion of management with other QHP " or appropriate source(s): None     Escalation of care considered, and ultimately not performed: None    Barriers to care at this time, including but not limited to: Patient does not have established PCP.     Decision tools and prescription drugs considered including, but not limited to: None.    FINAL DIAGNOSIS  1. Dentalgia    2. Dental caries         Electronically signed by: Riya Hankins D.O., 9/2/2024 11:02 AM

## 2024-09-02 NOTE — ED TRIAGE NOTES
"Chief Complaint   Patient presents with    Dental Pain     To top left tooth since yesterday, concerned there may now be an abscess       Patient to triage ambulatory with a steady gait, AAOx4, Appropriate precautions in place.     Explained wait time and triage process. Placed back in lobby. Told to notify ED tech or RN of any changes, verbalized understanding.    BP (!) 135/95   Pulse 96   Temp 36.6 °C (97.9 °F) (Temporal)   Resp 18   Ht 1.803 m (5' 11\")   Wt 81.9 kg (180 lb 8.9 oz)   SpO2 98%   BMI 25.18 kg/m²     "